# Patient Record
Sex: MALE | NOT HISPANIC OR LATINO
[De-identification: names, ages, dates, MRNs, and addresses within clinical notes are randomized per-mention and may not be internally consistent; named-entity substitution may affect disease eponyms.]

---

## 2021-09-09 ENCOUNTER — APPOINTMENT (OUTPATIENT)
Dept: PULMONOLOGY | Facility: CLINIC | Age: 66
End: 2021-09-09
Payer: COMMERCIAL

## 2021-09-09 VITALS — BODY MASS INDEX: 24.33 KG/M2 | HEIGHT: 67 IN | WEIGHT: 155 LBS

## 2021-09-09 DIAGNOSIS — E03.9 HYPOTHYROIDISM, UNSPECIFIED: ICD-10-CM

## 2021-09-09 PROBLEM — Z00.00 ENCOUNTER FOR PREVENTIVE HEALTH EXAMINATION: Status: ACTIVE | Noted: 2021-09-09

## 2021-09-09 PROCEDURE — 99213 OFFICE O/P EST LOW 20 MIN: CPT | Mod: 95

## 2021-09-09 NOTE — ASSESSMENT
[FreeTextEntry1] : 65 year  old man  with sleep apnea is doing well with the CPAP.  Patient is compliant with the CPAP and benefited significantly with the CPAP.\par Patients machine broke beyond repair and is not able to use the machine since.\par He is waiting for dme company to provide him with a new machine.

## 2021-09-09 NOTE — REASON FOR VISIT
[Follow-Up] : a follow-up visit [Sleep Apnea] : sleep apnea [Home] : at home, [unfilled] , at the time of the visit. [Medical Office: ([facility])___] : at the medical office located in  [Verbal consent obtained from patient] : the patient, [unfilled]

## 2021-09-09 NOTE — HISTORY OF PRESENT ILLNESS
[FreeTextEntry1] : 65 -year-old male with a history of chronic cough, hypothyroidism has sleep apnea.\par Patient has been on cpap\par ahi 7\par usage 6 hrs\par patients machine broke beyond repair and is waiting for dme company to provide him with a new cpap.\par He has snoring when he doesn’t use the cpap.\par Slightly sleepy during the daytime with epworth score of 11

## 2023-04-03 ENCOUNTER — NON-APPOINTMENT (OUTPATIENT)
Age: 68
End: 2023-04-03

## 2023-04-04 ENCOUNTER — NON-APPOINTMENT (OUTPATIENT)
Age: 68
End: 2023-04-04

## 2023-04-04 ENCOUNTER — APPOINTMENT (OUTPATIENT)
Dept: CARDIOLOGY | Facility: CLINIC | Age: 68
End: 2023-04-04
Payer: COMMERCIAL

## 2023-04-04 VITALS
OXYGEN SATURATION: 98 % | BODY MASS INDEX: 24.33 KG/M2 | SYSTOLIC BLOOD PRESSURE: 110 MMHG | DIASTOLIC BLOOD PRESSURE: 70 MMHG | HEART RATE: 64 BPM | WEIGHT: 155 LBS | HEIGHT: 67 IN

## 2023-04-04 DIAGNOSIS — E78.5 HYPERLIPIDEMIA, UNSPECIFIED: ICD-10-CM

## 2023-04-04 DIAGNOSIS — U07.1 COVID-19: ICD-10-CM

## 2023-04-04 DIAGNOSIS — Z82.49 FAMILY HISTORY OF ISCHEMIC HEART DISEASE AND OTHER DISEASES OF THE CIRCULATORY SYSTEM: ICD-10-CM

## 2023-04-04 DIAGNOSIS — Z86.39 PERSONAL HISTORY OF OTHER ENDOCRINE, NUTRITIONAL AND METABOLIC DISEASE: ICD-10-CM

## 2023-04-04 PROCEDURE — 93000 ELECTROCARDIOGRAM COMPLETE: CPT

## 2023-04-04 PROCEDURE — 99204 OFFICE O/P NEW MOD 45 MIN: CPT

## 2023-04-04 RX ORDER — LEVOTHYROXINE SODIUM 100 UG/1
100 TABLET ORAL DAILY
Refills: 0 | Status: ACTIVE | COMMUNITY

## 2023-04-04 NOTE — HISTORY OF PRESENT ILLNESS
[FreeTextEntry1] : This 67 year-old male patient presents for cardiovascular evaluation.\par \par His problem list is as noted above.\par \par The patient's 1st visit reportedly since approximately 2019.  He has been seen in the office however we are unable to locate previous records.  He is going to try to track things down for us.  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  He is undergoing physical therapy and Occupational Therapy.\par \par He does continue to walk regularly but not to the level that he did before.  He enjoys walking the trails.  Over the past 2 months or so he has noted some exertional dyspnea.  No true chest discomfort but he feels some limitation.  He does not need to stop but needs to feel to take deep breaths.  No PND orthopnea.  No palpitation, lightheadedness, fainting.  The symptoms concerned him and consultation is requested.  The patient has been historically throughout his entire life and very physically active.  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  He did have COVID that was evidently asymptomatic.\par \par

## 2023-04-04 NOTE — REVIEW OF SYSTEMS
[Negative] : Heme/Lymph [FreeTextEntry4] : He has allergies and likely related cough. [FreeTextEntry5] : See HPI. [FreeTextEntry6] : Allergic related cough and wheeze.  Snoring and sleep apnea, using a CPAP type device. [FreeTextEntry7] : History of anal fistula surgery.  Possible GERD. [FreeTextEntry8] : No nocturia.  No ED. [FreeTextEntry9] : Left upper extremity injury. [de-identified] : He has some work stress.  Also note his family is in [place].

## 2023-04-04 NOTE — ASSESSMENT
[FreeTextEntry1] : EKG 4/4/23.  Sinus rhythm.  Low voltage limb leads.  Otherwise within normal limits.

## 2023-04-04 NOTE — DISCUSSION/SUMMARY
[FreeTextEntry1] : \par Brief recommendations and follow-up: (see above for details)\par \par The patient is describing some exertional dyspnea.  He is not unstable but warrants follow-up.\par He will be scheduled for stress echocardiogram.\par I am initiating lipid therapy with rosuvastatin 5 mg daily.\par Return in approximately 1 month for a stress echocardiogram with the nurse practitioner.\par [facility] laboratories in 6 to 8 weeks from now.\par Next visit to me to be determined.\par The patient will also continue to track down any old records for me.

## 2023-04-04 NOTE — REASON FOR VISIT
[FreeTextEntry1] : Mr. LEONEL CRESPO has the following problem list:\par \par Sleep apnea\par Dyslipidemia\par \par He has additional medical problems as noted\par \par His primary care physician is in [place]: Dr. David De León

## 2023-05-01 ENCOUNTER — NON-APPOINTMENT (OUTPATIENT)
Age: 68
End: 2023-05-01

## 2023-05-22 ENCOUNTER — RESULT REVIEW (OUTPATIENT)
Age: 68
End: 2023-05-22

## 2023-05-23 ENCOUNTER — APPOINTMENT (OUTPATIENT)
Dept: CARDIOLOGY | Facility: CLINIC | Age: 68
End: 2023-05-23

## 2023-05-30 DIAGNOSIS — R06.09 OTHER FORMS OF DYSPNEA: ICD-10-CM

## 2023-05-30 DIAGNOSIS — Z92.89 PERSONAL HISTORY OF OTHER MEDICAL TREATMENT: ICD-10-CM

## 2023-06-20 DIAGNOSIS — Z01.89 ENCOUNTER FOR OTHER SPECIFIED SPECIAL EXAMINATIONS: ICD-10-CM

## 2024-04-30 ENCOUNTER — APPOINTMENT (OUTPATIENT)
Dept: PULMONOLOGY | Facility: CLINIC | Age: 69
End: 2024-04-30
Payer: COMMERCIAL

## 2024-04-30 VITALS — BODY MASS INDEX: 24.33 KG/M2 | HEART RATE: 70 BPM | HEIGHT: 67 IN | OXYGEN SATURATION: 98 % | WEIGHT: 155 LBS

## 2024-04-30 VITALS — SYSTOLIC BLOOD PRESSURE: 120 MMHG | DIASTOLIC BLOOD PRESSURE: 70 MMHG

## 2024-04-30 DIAGNOSIS — G47.33 OBSTRUCTIVE SLEEP APNEA (ADULT) (PEDIATRIC): ICD-10-CM

## 2024-04-30 PROCEDURE — 99213 OFFICE O/P EST LOW 20 MIN: CPT

## 2024-04-30 RX ORDER — ROSUVASTATIN CALCIUM 5 MG/1
5 TABLET, FILM COATED ORAL DAILY
Qty: 90 | Refills: 3 | Status: DISCONTINUED | COMMUNITY
Start: 2023-04-04 | End: 2024-04-30
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
## 2024-04-30 NOTE — ASSESSMENT
[FreeTextEntry1] : 68 year  old man  with sleep apnea is doing well with the CPAP.  Patient is compliant with the CPAP and benefited significantly with the CPAP.

## 2024-09-18 ENCOUNTER — TRANSCRIPTION ENCOUNTER (OUTPATIENT)
Age: 69
End: 2024-09-18

## 2024-10-15 ENCOUNTER — RESULT REVIEW (OUTPATIENT)
Age: 69
End: 2024-10-15

## 2025-05-05 ENCOUNTER — NON-APPOINTMENT (OUTPATIENT)
Age: 70
End: 2025-05-05

## 2025-05-07 ENCOUNTER — APPOINTMENT (OUTPATIENT)
Dept: SURGERY | Facility: CLINIC | Age: 70
End: 2025-05-07
Payer: COMMERCIAL

## 2025-05-07 VITALS
DIASTOLIC BLOOD PRESSURE: 71 MMHG | WEIGHT: 151.4 LBS | TEMPERATURE: 98 F | OXYGEN SATURATION: 98 % | BODY MASS INDEX: 23.71 KG/M2 | SYSTOLIC BLOOD PRESSURE: 121 MMHG | HEART RATE: 73 BPM

## 2025-05-07 DIAGNOSIS — Z83.3 FAMILY HISTORY OF DIABETES MELLITUS: ICD-10-CM

## 2025-05-07 DIAGNOSIS — G47.30 SLEEP APNEA, UNSPECIFIED: ICD-10-CM

## 2025-05-07 DIAGNOSIS — S49.91XA UNSPECIFIED INJURY OF RIGHT SHOULDER AND UPPER ARM, INITIAL ENCOUNTER: ICD-10-CM

## 2025-05-07 PROCEDURE — 99203 OFFICE O/P NEW LOW 30 MIN: CPT

## 2025-05-07 RX ORDER — ATORVASTATIN CALCIUM 10 MG/1
10 TABLET, FILM COATED ORAL
Refills: 0 | Status: ACTIVE | COMMUNITY

## 2025-07-16 PROBLEM — M25.512 LEFT SHOULDER PAIN, UNSPECIFIED CHRONICITY: Status: ACTIVE | Noted: 2025-07-16

## 2025-07-18 ENCOUNTER — RESULT REVIEW (OUTPATIENT)
Age: 70
End: 2025-07-18

## 2025-07-18 ENCOUNTER — APPOINTMENT (OUTPATIENT)
Facility: CLINIC | Age: 70
End: 2025-07-18
Payer: OTHER MISCELLANEOUS

## 2025-07-18 VITALS
SYSTOLIC BLOOD PRESSURE: 133 MMHG | BODY MASS INDEX: 24.27 KG/M2 | OXYGEN SATURATION: 99 % | HEIGHT: 66 IN | DIASTOLIC BLOOD PRESSURE: 78 MMHG | TEMPERATURE: 96.3 F | HEART RATE: 73 BPM | WEIGHT: 151 LBS | RESPIRATION RATE: 16 BRPM

## 2025-07-18 PROBLEM — M75.22 LEFT BICIPITAL TENOSYNOVITIS: Status: ACTIVE | Noted: 2025-07-18

## 2025-07-18 PROBLEM — M25.812 IMPINGEMENT OF LEFT SHOULDER: Status: ACTIVE | Noted: 2025-07-18

## 2025-07-18 PROCEDURE — 99204 OFFICE O/P NEW MOD 45 MIN: CPT | Mod: 25

## 2025-07-18 PROCEDURE — 20611 DRAIN/INJ JOINT/BURSA W/US: CPT | Mod: LT
